# Patient Record
Sex: MALE | Race: WHITE | Employment: FULL TIME | URBAN - METROPOLITAN AREA
[De-identification: names, ages, dates, MRNs, and addresses within clinical notes are randomized per-mention and may not be internally consistent; named-entity substitution may affect disease eponyms.]

---

## 2020-11-22 ENCOUNTER — HOSPITAL ENCOUNTER (EMERGENCY)
Age: 30
Discharge: HOME | End: 2020-11-22
Attending: EMERGENCY MEDICINE
Payer: COMMERCIAL

## 2020-11-22 ENCOUNTER — APPOINTMENT (OUTPATIENT)
Dept: GENERAL RADIOLOGY | Age: 30
End: 2020-11-22
Payer: COMMERCIAL

## 2020-11-22 VITALS
OXYGEN SATURATION: 96 % | SYSTOLIC BLOOD PRESSURE: 154 MMHG | BODY MASS INDEX: 35.29 KG/M2 | DIASTOLIC BLOOD PRESSURE: 92 MMHG | WEIGHT: 275 LBS | HEART RATE: 96 BPM | TEMPERATURE: 97.2 F | HEIGHT: 74 IN | RESPIRATION RATE: 16 BRPM

## 2020-11-22 PROCEDURE — 71046 X-RAY EXAM CHEST 2 VIEWS: CPT

## 2020-11-22 PROCEDURE — 99284 EMERGENCY DEPT VISIT MOD MDM: CPT

## 2020-11-22 RX ORDER — LIDOCAINE 50 MG/G
1 PATCH TOPICAL DAILY
Qty: 10 PATCH | Refills: 0 | Status: SHIPPED | OUTPATIENT
Start: 2020-11-22 | End: 2020-12-02

## 2020-11-22 RX ORDER — CYCLOBENZAPRINE HCL 10 MG
10 TABLET ORAL NIGHTLY PRN
Qty: 10 TABLET | Refills: 0 | Status: SHIPPED | OUTPATIENT
Start: 2020-11-22 | End: 2020-12-02

## 2020-11-22 RX ORDER — NAPROXEN 375 MG/1
375 TABLET ORAL 2 TIMES DAILY WITH MEALS
Qty: 60 TABLET | Refills: 0 | Status: SHIPPED | OUTPATIENT
Start: 2020-11-22

## 2020-11-22 ASSESSMENT — ENCOUNTER SYMPTOMS
BACK PAIN: 1
ABDOMINAL PAIN: 0
VOMITING: 0
SORE THROAT: 0
DIARRHEA: 0
COUGH: 0
WHEEZING: 0
SHORTNESS OF BREATH: 0
CHEST TIGHTNESS: 0
NAUSEA: 0

## 2020-11-22 ASSESSMENT — PAIN DESCRIPTION - PAIN TYPE: TYPE: ACUTE PAIN

## 2020-11-22 ASSESSMENT — PAIN DESCRIPTION - LOCATION: LOCATION: RIB CAGE;BACK

## 2020-11-22 ASSESSMENT — PAIN SCALES - GENERAL: PAINLEVEL_OUTOF10: 6

## 2020-11-22 ASSESSMENT — PAIN DESCRIPTION - DESCRIPTORS: DESCRIPTORS: ACHING;DULL;SHARP

## 2020-11-22 ASSESSMENT — PAIN DESCRIPTION - ORIENTATION: ORIENTATION: LEFT;MID

## 2020-11-22 ASSESSMENT — PAIN - FUNCTIONAL ASSESSMENT: PAIN_FUNCTIONAL_ASSESSMENT: ACTIVITIES ARE NOT PREVENTED

## 2020-11-22 ASSESSMENT — PAIN DESCRIPTION - FREQUENCY: FREQUENCY: CONTINUOUS

## 2020-11-22 ASSESSMENT — PAIN DESCRIPTION - PROGRESSION: CLINICAL_PROGRESSION: GRADUALLY WORSENING

## 2020-11-22 ASSESSMENT — PAIN DESCRIPTION - ONSET: ONSET: ON-GOING

## 2020-11-22 NOTE — ED PROVIDER NOTES
pain, diarrhea, nausea and vomiting. Genitourinary: Negative for dysuria and frequency. Musculoskeletal: Positive for back pain. Negative for myalgias, neck pain and neck stiffness. Skin: Negative for rash and wound. Neurological: Negative for dizziness, weakness, numbness and headaches. Physical Exam  Vitals signs and nursing note reviewed. Constitutional:       General: He is not in acute distress. Appearance: He is not ill-appearing. Comments: No midline spinal tenderness, there is no pain reproducible with palpation anywhere on exam; patient describes the pain when he flexes his neck   HENT:      Head: Normocephalic and atraumatic. Right Ear: External ear normal.      Left Ear: External ear normal.      Nose: Nose normal. No rhinorrhea. Mouth/Throat:      Mouth: Mucous membranes are moist.      Pharynx: Oropharynx is clear. Eyes:      Extraocular Movements: Extraocular movements intact. Conjunctiva/sclera: Conjunctivae normal.      Pupils: Pupils are equal, round, and reactive to light. Neck:      Musculoskeletal: Normal range of motion and neck supple. No neck rigidity or muscular tenderness. Cardiovascular:      Rate and Rhythm: Normal rate and regular rhythm. Pulses: Normal pulses. Heart sounds: Normal heart sounds. Pulmonary:      Effort: Pulmonary effort is normal. No respiratory distress. Breath sounds: Normal breath sounds. No wheezing or rales. Abdominal:      General: Bowel sounds are normal.      Palpations: Abdomen is soft. Tenderness: There is no abdominal tenderness. There is no right CVA tenderness, left CVA tenderness or guarding. Musculoskeletal: Normal range of motion. General: No tenderness. Right lower leg: No edema. Left lower leg: No edema. Skin:     General: Skin is warm and dry. Capillary Refill: Capillary refill takes less than 2 seconds.    Neurological:      General: No focal deficit present. Mental Status: He is alert and oriented to person, place, and time. Sensory: No sensory deficit. Motor: No weakness. Psychiatric:         Mood and Affect: Mood normal.         Behavior: Behavior normal.           Procedures       MDM     27-year-old male presents to the ED by private vehicle due to left-sided back pain for 1.5 weeks which x3 days ago worsened slightly and now radiates to his left lower ribs. On arrival to the ED patient's vitals are stable, he is in no acute distress. He is not endorsing any chest pain. On exam there is no recent producible tenderness to palpation anywhere, patient states that his pain mostly occurs only when he puts his chin to his chest, flexing his neck; starts in his back inferomedial to his scapula and radiates around the ribs. Physical exam is otherwise unremarkable. He was seen at an urgent care, had a normal EKG, was sent here for further work-up of chest pain which the patient denies. Chest x-ray was ordered which did not show any acute abnormalities. Doubt any cardiopulmonary pathology, suspect more likely musculoskeletal etiology; feel this patient is appropriate for discharge with instructions for outpatient follow-up and strict return precautions. Discussed results and plan in their entirety with the patient, he is amenable and his questions were answered at this time. He remained hemodynamically stable throughout this encounter. I have discussed this patient with my attending, who has seen the patient and agrees with this disposition. Patient was seen and evaluated by myself and my attending No att. providers found. Assessment and Plan discussed with attending provider, please see attestation for final plan of care.        ED Course as of Nov 22 2218   Sun Nov 22, 2020   1256 On reassessment patient is resting comfortably in bed, no acute distress; discussed results and plan with him in their entirety, he is amenable and his questions are answered at this time. Return precautions discussed. [VG]      ED Course User Index  [VG] Zachariah Galindo,        --------------------------------------------- PAST HISTORY ---------------------------------------------  Past Medical History:  has a past medical history of Lumbar disc herniation. Past Surgical History:  has a past surgical history that includes hernia repair. Social History:  reports that he has never smoked. He has never used smokeless tobacco. He reports previous alcohol use. He reports that he does not use drugs. Family History: family history is not on file. The patients home medications have been reviewed. Allergies: Patient has no known allergies. -------------------------------------------------- RESULTS -------------------------------------------------  Labs:  No results found for this visit on 11/22/20. Radiology:  XR CHEST (2 VW)   Final Result   No acute cardiopulmonary disease in the visualized chest   Slight thoracic spine curvature            ------------------------- NURSING NOTES AND VITALS REVIEWED ---------------------------  Date / Time Roomed:  11/22/2020 11:30 AM  ED Bed Assignment:  20/20    The nursing notes within the ED encounter and vital signs as below have been reviewed. BP (!) 154/92   Pulse 96   Temp 97.2 °F (36.2 °C) (Tympanic)   Resp 16   Ht 6' 2\" (1.88 m)   Wt 275 lb (124.7 kg)   SpO2 96%   BMI 35.31 kg/m²   Oxygen Saturation Interpretation: Normal      ------------------------------------------ PROGRESS NOTES ------------------------------------------  11:52 AM EST  I have spoken with the patient and discussed todays results, in addition to providing specific details for the plan of care and counseling regarding the diagnosis and prognosis. Their questions are answered at this time and they are agreeable with the plan. I discussed at length with them reasons for immediate return here for re evaluation.  They will followup with their primary care physician by calling their office on Monday.      --------------------------------- ADDITIONAL PROVIDER NOTES ---------------------------------  At this time the patient is without objective evidence of an acute process requiring hospitalization or inpatient management. They have remained hemodynamically stable throughout their entire ED visit and are stable for discharge with outpatient follow-up. The plan has been discussed in detail and they are aware of the specific conditions for emergent return, as well as the importance of follow-up. Discharge Medication List as of 11/22/2020  1:00 PM      START taking these medications    Details   lidocaine (LIDODERM) 5 % Place 1 patch onto the skin daily for 10 days 12 hours on, 12 hours off., Disp-10 patch,R-0Print      cyclobenzaprine (FLEXERIL) 10 MG tablet Take 1 tablet by mouth nightly as needed for Muscle spasms, Disp-10 tablet,R-0Print      naproxen (NAPROSYN) 375 MG tablet Take 1 tablet by mouth 2 times daily (with meals), Disp-60 tablet,R-0Print             Diagnosis:  1. Rib pain    2. Acute left-sided low back pain, unspecified whether sciatica present        Disposition:  Patient's disposition: Discharge to home  Patient's condition is stable.          Jd Lewis DO  Resident  11/22/20 [id number]